# Patient Record
Sex: FEMALE | URBAN - METROPOLITAN AREA
[De-identification: names, ages, dates, MRNs, and addresses within clinical notes are randomized per-mention and may not be internally consistent; named-entity substitution may affect disease eponyms.]

---

## 2024-07-05 ENCOUNTER — OFFICE VISIT (OUTPATIENT)
Dept: URBAN - METROPOLITAN AREA CLINIC 63 | Facility: CLINIC | Age: 39
End: 2024-07-05
Payer: COMMERCIAL

## 2024-07-05 ENCOUNTER — DASHBOARD ENCOUNTERS (OUTPATIENT)
Age: 39
End: 2024-07-05

## 2024-07-05 VITALS
DIASTOLIC BLOOD PRESSURE: 78 MMHG | BODY MASS INDEX: 50.45 KG/M2 | SYSTOLIC BLOOD PRESSURE: 131 MMHG | HEART RATE: 76 BPM | TEMPERATURE: 98.7 F | HEIGHT: 60 IN | WEIGHT: 257 LBS | OXYGEN SATURATION: 98 %

## 2024-07-05 DIAGNOSIS — K62.5 RECTAL BLEEDING: ICD-10-CM

## 2024-07-05 DIAGNOSIS — E66.3 OVERWEIGHT: ICD-10-CM

## 2024-07-05 PROBLEM — 12063002: Status: ACTIVE | Noted: 2024-07-05

## 2024-07-05 PROBLEM — 238131007: Status: ACTIVE | Noted: 2024-07-05

## 2024-07-05 PROCEDURE — 99203 OFFICE O/P NEW LOW 30 MIN: CPT | Performed by: INTERNAL MEDICINE

## 2024-07-05 RX ORDER — DROSPIRENONE 4 MG/1
1 TABLET TABLET, FILM COATED ORAL ONCE A DAY
Qty: 30 | Status: ACTIVE | COMMUNITY
Start: 2024-07-05

## 2024-07-05 RX ORDER — CELECOXIB 200 MG/1
TAKE 1 CAPSULE BY MOUTH TWICE A DAY WITH FOOD CAPSULE ORAL
Qty: 60 EACH | Refills: 0 | Status: ACTIVE | COMMUNITY

## 2024-07-05 RX ORDER — PREDNISONE 20 MG/1
TABLET ORAL
Qty: 10 TABLET | Status: ON HOLD | COMMUNITY

## 2024-07-05 RX ORDER — SEMAGLUTIDE 0.5 MG/.5ML
INJECTION, SOLUTION SUBCUTANEOUS
Qty: 2 MILLILITER | Status: ON HOLD | COMMUNITY

## 2024-07-05 RX ORDER — PANTOPRAZOLE SODIUM 40 MG/1
TABLET, DELAYED RELEASE ORAL
Qty: 90 TABLET | Status: ON HOLD | COMMUNITY

## 2024-07-05 NOTE — PHYSICAL EXAM GASTROINTESTINAL
soft, nontender, nondistended , no masses palpable , normal bowel sounds , RHONDA - NO HEMORRHOIDS - BROWN HEME NEG STOOL/ NO LESIONS FELT

## 2024-07-05 NOTE — HPI-TODAY'S VISIT:
Dusty is a pleasant 39-year-old female who has been referred here for evaluation of "blood in stool". Apparently she had an episode of nausea vomiting and diarrhea at the end of May which she felt was due to possible food poisoning from a potluck that she attended. The symptoms have not resolved. Between January to April she was on Wegovy which she tolerated well but was later discontinued due to lack of insurance coverage. She reports intermittent episodes of seeing blood when she wipes after a bowel movement. Occasional itching and burning also reported. She denies any change in bowel habits but reports that depending on her diet sometimes she develops mild constipation. She has rare acid reflux depending on what she eats. She is currently on Celebrex for foot pain. She denies any symptoms of dysphagia early satiety or bloating or abdominal pain. Denies nausea vomiting. She is overweight and she is taking means to overcome it by joining weight watchers, exercising 4-5 times a week. No prior endoscopies or colonoscopies.NO FAMILY H/O COLON CANCER

## 2024-07-12 ENCOUNTER — LAB OUTSIDE AN ENCOUNTER (OUTPATIENT)
Dept: URBAN - METROPOLITAN AREA CLINIC 63 | Facility: CLINIC | Age: 39
End: 2024-07-12

## 2024-07-23 ENCOUNTER — WEB ENCOUNTER (OUTPATIENT)
Dept: URBAN - METROPOLITAN AREA CLINIC 63 | Facility: CLINIC | Age: 39
End: 2024-07-23

## 2024-08-01 ENCOUNTER — LAB OUTSIDE AN ENCOUNTER (OUTPATIENT)
Dept: URBAN - METROPOLITAN AREA CLINIC 63 | Facility: CLINIC | Age: 39
End: 2024-08-01

## 2024-10-14 ENCOUNTER — OFFICE VISIT (OUTPATIENT)
Dept: URBAN - METROPOLITAN AREA CLINIC 60 | Facility: CLINIC | Age: 39
End: 2024-10-14

## 2024-10-17 ENCOUNTER — OFFICE VISIT (OUTPATIENT)
Dept: URBAN - METROPOLITAN AREA CLINIC 63 | Facility: CLINIC | Age: 39
End: 2024-10-17

## 2024-10-21 ENCOUNTER — OFFICE VISIT (OUTPATIENT)
Dept: URBAN - METROPOLITAN AREA CLINIC 60 | Facility: CLINIC | Age: 39
End: 2024-10-21
Payer: COMMERCIAL

## 2024-10-21 VITALS
WEIGHT: 243 LBS | HEIGHT: 60 IN | DIASTOLIC BLOOD PRESSURE: 76 MMHG | TEMPERATURE: 97.7 F | OXYGEN SATURATION: 98 % | SYSTOLIC BLOOD PRESSURE: 126 MMHG | BODY MASS INDEX: 47.71 KG/M2 | HEART RATE: 114 BPM

## 2024-10-21 DIAGNOSIS — K76.0 FATTY LIVER: ICD-10-CM

## 2024-10-21 DIAGNOSIS — K59.03 DRUG-INDUCED CONSTIPATION: ICD-10-CM

## 2024-10-21 DIAGNOSIS — K62.5 RECTAL BLEEDING: ICD-10-CM

## 2024-10-21 DIAGNOSIS — K21.9 GERD WITHOUT ESOPHAGITIS: ICD-10-CM

## 2024-10-21 PROBLEM — 197321007: Status: ACTIVE | Noted: 2024-10-21

## 2024-10-21 PROBLEM — 266435005: Status: ACTIVE | Noted: 2024-10-21

## 2024-10-21 PROCEDURE — 99214 OFFICE O/P EST MOD 30 MIN: CPT

## 2024-10-21 RX ORDER — PANTOPRAZOLE SODIUM 40 MG/1
TABLET, DELAYED RELEASE ORAL
Qty: 90 TABLET | Status: ACTIVE | COMMUNITY

## 2024-10-21 RX ORDER — TIRZEPATIDE 7.5 MG/.5ML
INJECTION, SOLUTION SUBCUTANEOUS
Qty: 2 MILLILITER | Status: ACTIVE | COMMUNITY

## 2024-10-21 RX ORDER — FLUCONAZOLE 150 MG/1
TAKE 1 TABLET BY MOUTH ONCE FOR 1 DAY TABLET ORAL
Qty: 1 EACH | Refills: 0 | Status: ACTIVE | COMMUNITY

## 2024-10-21 RX ORDER — CHLORPHENIRAMINE MALEATE 4 MG/1
1 TABLET AS NEEDED TABLET ORAL
Status: ACTIVE | COMMUNITY
Start: 2024-10-21

## 2024-10-21 NOTE — HPI-PREVIOUS IMAGING
Abdominal ultrasound 8/1/2024 noted hepatic steatosis otherwise no acute abnormality seen   FibroScan 8/1/2024 noted severe steatosis (S3) and no fibrosis (F0)

## 2024-10-21 NOTE — HPI-TODAY'S VISIT:
Patient is here for f/u for brbpr and fatty liver. We review results at today's appointment.  Ultrasound consistent with fatty liver and FibroScan showing severe amount of fatty liver but no fibrosis.  Patient has been continuing with weight loss with Zepbound.  She complains of intermittent reflux symptoms but is recently been started on pantoprazole 40 mg and we discussed how to appropriately take medication.  She knows that this is diet and lifestyle related as whenever she has salt, vinegar, spicy foods or she eats too late at night she has symptoms.  She is working on these lifestyle changes.  She also has noticed since starting zepbound on she has become more constipated sometimes having a bowel movement every day other times skipping a day or 2.  She has been on Metamucil every day as well as a high-fiber diet and water with some efficacy but was wondering if she should start any sort of laxative.  No other GI complaints.  No further signs of GI bleeding.  Denies nausea, vomiting, dysphagia, unintentional weight loss, fever or chills.  Last OV 7/2024 Dr. Miller: Dusty is a pleasant 39-year-old female who has been referred here for evaluation of "blood in stool". Apparently she had an episode of nausea vomiting and diarrhea at the end of May which she felt was due to possible food poisoning from a potluck that she attended. The symptoms have not resolved. Between January to April she was on Wegovy which she tolerated well but was later discontinued due to lack of insurance coverage. She reports intermittent episodes of seeing blood when she wipes after a bowel movement. Occasional itching and burning also reported. She denies any change in bowel habits but reports that depending on her diet sometimes she develops mild constipation. She has rare acid reflux depending on what she eats. She is currently on Celebrex for foot pain. She denies any symptoms of dysphagia early satiety or bloating or abdominal pain. Denies nausea vomiting. She is overweight and she is taking means to overcome it by joining weight watchers, exercising 4-5 times a week. No prior endoscopies or colonoscopies.NO FAMILY H/O COLON CANCER

## 2025-04-07 ENCOUNTER — OFFICE VISIT (OUTPATIENT)
Dept: URBAN - METROPOLITAN AREA CLINIC 60 | Facility: CLINIC | Age: 40
End: 2025-04-07